# Patient Record
Sex: MALE | URBAN - METROPOLITAN AREA
[De-identification: names, ages, dates, MRNs, and addresses within clinical notes are randomized per-mention and may not be internally consistent; named-entity substitution may affect disease eponyms.]

---

## 2019-04-19 ENCOUNTER — OFFICE VISIT (OUTPATIENT)
Dept: URBAN - METROPOLITAN AREA CLINIC 76 | Facility: CLINIC | Age: 29
End: 2019-04-19
Payer: COMMERCIAL

## 2019-04-19 DIAGNOSIS — H10.45 OTHER CHRONIC ALLERGIC CONJUNCTIVITIS: Primary | ICD-10-CM

## 2019-04-19 PROCEDURE — 92012 INTRM OPH EXAM EST PATIENT: CPT | Performed by: OPTOMETRIST

## 2019-04-19 RX ORDER — BEPOTASTINE BESILATE 15 MG/ML
1.5 % SOLUTION/ DROPS OPHTHALMIC
Qty: 1 | Refills: 4 | Status: INACTIVE
Start: 2019-04-19 | End: 2019-04-30

## 2019-04-19 ASSESSMENT — INTRAOCULAR PRESSURE
OD: 13
OS: 14

## 2019-04-19 NOTE — IMPRESSION/PLAN
Impression: Other chronic allergic conjunctivitis: H10.45. Plan: Discussed diagnosis with patient. Recommend Bepreve 1 drop bid ou, prn. Rub excess into lids.

## 2019-06-17 ENCOUNTER — OFFICE VISIT (OUTPATIENT)
Dept: URBAN - METROPOLITAN AREA CLINIC 76 | Facility: CLINIC | Age: 29
End: 2019-06-17
Payer: COMMERCIAL

## 2019-06-17 DIAGNOSIS — H52.13 MYOPIA, BILATERAL: Primary | ICD-10-CM

## 2019-06-17 PROCEDURE — 92014 COMPRE OPH EXAM EST PT 1/>: CPT | Performed by: OPTOMETRIST

## 2019-06-17 RX ORDER — BEPOTASTINE BESILATE 15 MG/ML
1.5 % SOLUTION/ DROPS OPHTHALMIC
Qty: 0 | Refills: 0 | Status: INACTIVE
Start: 2019-06-17 | End: 2020-01-23

## 2019-06-17 ASSESSMENT — VISUAL ACUITY
OD: 20/25
OS: 20/20

## 2019-06-17 ASSESSMENT — KERATOMETRY
OD: 43.63
OS: 43.50

## 2019-06-17 ASSESSMENT — INTRAOCULAR PRESSURE
OS: 14
OD: 14

## 2020-01-23 ENCOUNTER — OFFICE VISIT (OUTPATIENT)
Dept: URBAN - METROPOLITAN AREA CLINIC 76 | Facility: CLINIC | Age: 30
End: 2020-01-23
Payer: COMMERCIAL

## 2020-01-23 PROCEDURE — 92012 INTRM OPH EXAM EST PATIENT: CPT | Performed by: OPTOMETRIST

## 2020-01-23 RX ORDER — LOTEPREDNOL ETABONATE 2 MG/ML
0.2 % SUSPENSION/ DROPS OPHTHALMIC
Qty: 5 | Refills: 2 | Status: INACTIVE
Start: 2020-01-23 | End: 2020-09-30

## 2020-01-23 ASSESSMENT — INTRAOCULAR PRESSURE
OD: 14
OS: 14

## 2020-01-23 NOTE — IMPRESSION/PLAN
Impression: Other chronic allergic conjunctivitis: H10.45. Plan: Rediscussed diagnosis with patient. Continue Bepreve 1 drop bid ou, prn. Rub excess into lids. Start Alrex 0.2% TID OU. Return for a 1 month fu. Pt is to  call with any concerns.

## 2020-02-20 ENCOUNTER — OFFICE VISIT (OUTPATIENT)
Dept: URBAN - METROPOLITAN AREA CLINIC 76 | Facility: CLINIC | Age: 30
End: 2020-02-20
Payer: COMMERCIAL

## 2020-02-20 DIAGNOSIS — H04.123 DRY EYE SYNDROME OF BILATERAL LACRIMAL GLANDS: Primary | ICD-10-CM

## 2020-02-20 PROCEDURE — 99213 OFFICE O/P EST LOW 20 MIN: CPT | Performed by: OPTOMETRIST

## 2020-02-20 ASSESSMENT — INTRAOCULAR PRESSURE
OD: 14
OS: 14

## 2020-02-20 NOTE — IMPRESSION/PLAN
Impression: Dry eye syndrome of bilateral lacrimal glands: H04.123. OU. Plan: Rediscussed diagnosis in detail with patient. Warm compresses with lid massage from top / down, bottom / up, and sweep from inside / out x 2. Patient instructed to use emulsive base lubricant 3-4 x a day. Increase omega foods/nuts and/or Borage oil supplement 1500-2500mg capsule orally per day.

## 2020-02-20 NOTE — IMPRESSION/PLAN
Impression: Other chronic allergic conjunctivitis: H10.45. Plan: Rediscussed diagnosis with patient. Continue Bepreve 1 drop bid ou, prn. Rub excess into lids. Continue Alrex 0.2% as a rescue gtt. PRN. Pt is to  call with any concerns.

## 2020-09-30 ENCOUNTER — OFFICE VISIT (OUTPATIENT)
Dept: URBAN - METROPOLITAN AREA CLINIC 76 | Facility: CLINIC | Age: 30
End: 2020-09-30
Payer: COMMERCIAL

## 2020-09-30 DIAGNOSIS — H11.012 AMYLOID PTERYGIUM OF LEFT EYE: ICD-10-CM

## 2020-09-30 PROCEDURE — 99213 OFFICE O/P EST LOW 20 MIN: CPT | Performed by: OPTOMETRIST

## 2020-09-30 ASSESSMENT — INTRAOCULAR PRESSURE
OD: 14
OS: 14

## 2020-09-30 NOTE — IMPRESSION/PLAN
Impression: Amyloid pterygium of left eye: H11.012. Left. Nasal. Plan: See Plan #1. Ok to use a drop that gets the red out on occasion but not daily.

## 2020-09-30 NOTE — IMPRESSION/PLAN
Impression: Dry eye syndrome of bilateral lacrimal glands: H04.123. MGD. Bilateral. worse in the morning. Plan: Discussed condition in detail. Recommend Omega 3's, sample of PRN given. Increase artificial tears 3-4 times per day. Use Genteal Gel Severe QHS OU. Consider Hypochlorous Acid spray.

## 2020-09-30 NOTE — IMPRESSION/PLAN
Impression: Other chronic allergic conjunctivitis: H10.45. Bilateral. Rates itch 5/10. Only uses Bepreve twice weekly. Takes Claritin frequently. Plan: Discussed. Advise the use of Bepreve/Ketotifen BID OU daily. Continue Claritin. Call if symptoms do not resolve or if worsens.

## 2021-04-27 ENCOUNTER — OFFICE VISIT (OUTPATIENT)
Dept: URBAN - METROPOLITAN AREA CLINIC 76 | Facility: CLINIC | Age: 31
End: 2021-04-27
Payer: COMMERCIAL

## 2021-04-27 PROCEDURE — 92014 COMPRE OPH EXAM EST PT 1/>: CPT | Performed by: OPTOMETRIST

## 2021-04-27 PROCEDURE — 76514 ECHO EXAM OF EYE THICKNESS: CPT | Performed by: OPTOMETRIST

## 2021-04-27 ASSESSMENT — VISUAL ACUITY
OD: 20/20
OS: 20/20

## 2021-04-27 ASSESSMENT — KERATOMETRY
OD: 43.50
OS: 43.75

## 2021-04-27 ASSESSMENT — INTRAOCULAR PRESSURE
OS: 14
OD: 14

## 2021-04-27 NOTE — IMPRESSION/PLAN
Impression: Myopia, bilateral: H52.13. VA stable. Performed Pach due to pt interest in LASIK. Plan: Discussed condition. New mrx given today. Pt expressed interesting refractive Sx, pt understands not to fill glasses if perusing refractive Sx. Referred for refractive consult. Pt to call with any concerns.

## 2021-06-07 ENCOUNTER — OFFICE VISIT (OUTPATIENT)
Dept: URBAN - METROPOLITAN AREA CLINIC 76 | Facility: CLINIC | Age: 31
End: 2021-06-07

## 2021-06-07 ASSESSMENT — INTRAOCULAR PRESSURE
OD: 14
OS: 14

## 2021-06-07 ASSESSMENT — VISUAL ACUITY
OD: 20/20
OS: 20/20

## 2021-06-07 ASSESSMENT — KERATOMETRY
OD: 43.38
OS: 43.50

## 2021-06-07 NOTE — IMPRESSION/PLAN
Impression: Myopia, bilateral: H52.13. Plan: Reviewed all testing with patient discussed all risks and benefits of PRK vs LASIK. Explained pt he will need a consult with Dr. Elena Iraheta in [place] for further testing and pt is okay to have same day procedure.

## 2023-03-31 ENCOUNTER — OFFICE VISIT (OUTPATIENT)
Dept: URBAN - METROPOLITAN AREA CLINIC 76 | Facility: CLINIC | Age: 33
End: 2023-03-31
Payer: COMMERCIAL

## 2023-03-31 DIAGNOSIS — H52.13 MYOPIA, BILATERAL: Primary | ICD-10-CM

## 2023-03-31 PROCEDURE — 92014 COMPRE OPH EXAM EST PT 1/>: CPT | Performed by: OPTOMETRIST

## 2023-03-31 ASSESSMENT — INTRAOCULAR PRESSURE
OS: 18
OD: 18

## 2023-03-31 ASSESSMENT — KERATOMETRY
OD: 43.63
OS: 43.63

## 2023-03-31 ASSESSMENT — VISUAL ACUITY
OD: 20/20
OS: 20/20

## 2023-03-31 NOTE — IMPRESSION/PLAN
Impression: Myopia, bilateral: H52.13. Plan: Discussed condition in detail with patient. Dispensed Rx for glasses to patient and instructed on normal adaptation period. Did discuss the possibility for refractive sx and presbyopia with the patient, but pt elects to rather wait.

## 2024-06-18 ENCOUNTER — OFFICE VISIT (OUTPATIENT)
Dept: URBAN - METROPOLITAN AREA CLINIC 76 | Facility: CLINIC | Age: 34
End: 2024-06-18
Payer: COMMERCIAL

## 2024-06-18 DIAGNOSIS — H52.13 MYOPIA, BILATERAL: Primary | ICD-10-CM

## 2024-06-18 PROCEDURE — 92014 COMPRE OPH EXAM EST PT 1/>: CPT | Performed by: OPTOMETRIST

## 2024-06-18 ASSESSMENT — KERATOMETRY
OD: 43.38
OS: 43.63

## 2024-06-18 ASSESSMENT — VISUAL ACUITY
OD: 20/20
OS: 20/20

## 2024-06-18 ASSESSMENT — INTRAOCULAR PRESSURE
OD: 16
OS: 18